# Patient Record
Sex: MALE | Race: WHITE | NOT HISPANIC OR LATINO | Employment: FULL TIME | URBAN - METROPOLITAN AREA
[De-identification: names, ages, dates, MRNs, and addresses within clinical notes are randomized per-mention and may not be internally consistent; named-entity substitution may affect disease eponyms.]

---

## 2019-07-15 ENCOUNTER — OFFICE VISIT (OUTPATIENT)
Dept: DERMATOLOGY | Facility: CLINIC | Age: 39
End: 2019-07-15
Payer: COMMERCIAL

## 2019-07-15 VITALS — HEART RATE: 68 BPM | DIASTOLIC BLOOD PRESSURE: 67 MMHG | SYSTOLIC BLOOD PRESSURE: 120 MMHG

## 2019-07-15 DIAGNOSIS — D22.9 NEVUS: Primary | ICD-10-CM

## 2019-07-15 DIAGNOSIS — L30.9 DERMATITIS: ICD-10-CM

## 2019-07-15 RX ORDER — OMEPRAZOLE 40 MG/1
40 CAPSULE, DELAYED RELEASE ORAL
COMMUNITY
Start: 2016-07-25

## 2019-07-15 RX ORDER — TADALAFIL 20 MG/1
10 TABLET ORAL
COMMUNITY
Start: 2019-01-04

## 2019-07-15 RX ORDER — TESTOSTERONE CYPIONATE 200 MG/ML
120 INJECTION, SOLUTION INTRAMUSCULAR
COMMUNITY
Start: 2016-11-22

## 2019-07-15 RX ORDER — CHOLECALCIFEROL (VITAMIN D3) 50 MCG
2000 TABLET ORAL
COMMUNITY
Start: 2018-07-31

## 2019-07-15 RX ORDER — VALACYCLOVIR HYDROCHLORIDE 1 G/1
1 TABLET, FILM COATED ORAL
COMMUNITY
Start: 2019-01-04

## 2019-07-15 RX ORDER — MULTIVIT-MIN/IRON FUM/FOLIC AC 7.5 MG-4
1 TABLET ORAL
COMMUNITY
Start: 2016-12-19

## 2019-07-15 ASSESSMENT — PAIN SCALES - GENERAL: PAINLEVEL: NO PAIN (0)

## 2019-07-15 NOTE — PROGRESS NOTES
[facility] Dermatology Note      Dermatology Problem List:  1. Multiple clinically benign nevi  - monitor, consider mole mapping and visit in the [facility]mented Lesions Clinic with Dr. Lazcano  2. Xerosis, dry scaly plaque on only the right medial and lateral foot    KOH prep was obtained and interpreted as negative.     Photodocumentation    Start Protopic - apply with flares       Encounter Date: Jul 15, 2019    CC:  Chief Complaint   Patient presents with     Skin Check     No personal or family hx of skin cancer. Romeo notes moles on his back.         History of Present Illness:  Mr. Trey Ghosh is a 38 year old male who presents for evaluation of moles on the back. He has never seen a dermatologist before. Today his main concern is with moles present on the back. His wife reports that there is one on the back that changes in color, occasionally gets red and darker. Also reports of getting sunburnt occasionally. On the feet, the patient has experienced occasional itch, dry skin and bumps. Happens year round, no irritation from the shoes. Has not tried any treatment. Denies pain, tenderness or burning. Reports redness of the toes with no swelling.     Wife and daughter present at today's visit.     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    Social History:  No personal hx of skin cancer.   Patient is , has 3 children.     Family History:  No family hx of skin cancer.  Family History   Problem Relation Age of Onset     Autoimmune Disease No family hx of      Melanoma No family hx of      Skin Cancer No family hx of        Medications:  Current Outpatient Medications   Medication Sig Dispense Refill     multivitamins w/minerals tablet Take 1 tablet by mouth       Omega-3 Fatty Acids  (FISH OIL PO) Take 2 g by mouth       omeprazole (PRILOSEC) 40 MG DR capsule Take 40 mg by mouth       tadalafil (CIALIS) 20 MG tablet Take 10 mg by mouth       testosterone cypionate (DEPO-TESTOSTERONE) 200 MG/ML injection Inject 120 mg into the muscle       valACYclovir (VALTREX) 1000 mg tablet Take 1 g by mouth       vitamin D3 (CHOLECALCIFEROL) 2000 units (50 mcg) tablet Take 2,000 Units by mouth         Allergies   Allergen Reactions     Penicillins Hives and Nausea     Other reaction(s): Gastrointestinal       Review of Systems:  -Skin: as per HPI.  -Constitutional: The patient is feeling generally well.    Physical exam:  Vitals: /67   Pulse 68   GEN: This is a well developed, well-nourished male in no acute distress, in a pleasant mood.    SKIN: Total skin excluding the undergarment areas was performed. The exam included the head/face, neck, both arms, chest, back, abdomen, both legs, digits and/or nails.   - Multiple regular brown pigmented macules and papules are identified on the neck, trunk, back and extremities.    - small papules on the face   - Calluses on the hands   - Blanching erythema on both 2nd distal toes  - Dry scaly plaque on only the right medial and lateral foot   - No other lesions of concern on areas examined.       Impression/Plan:  1. Multiple clinically benign nevi     No further intervention needed, monitor and patient to report changes.     Recommended following up with pigmented lesions clinic.     Photodocumentation     2. Xerosis, dry scaly plaque on only the right medial and lateral foot    KOH prep was obtained and interpreted as negative.     Photodocumentation    Start Protopic - apply with flares     3.   Blanching erythema on both 2nd distal toes, non tender to palpation and does not disappear with elevation     No further intervention needed.     Photodocumentation    Could consider evaluation with vascular      Follow-up in 6 months with Dr. Lazcano for mole  mapping, earlier for new or changing lesions.       Scribe Disclosure  I, Anna Mensah, am serving as a scribe to document services personally performed by Dr. Barbara Mosquera MD, based on data collection and the provider's statements to me.     Provider Disclosure:   I agree with above History, Review of Systems, Physical exam and Plan. I have reviewed the content of the documentation and have edited it as needed. I have personally performed the services documented here and the documentation accurately represents those services and the decisions I have made.       Laurie Weber MD  Professor and Chair  Department of Dermatology  [facility]

## 2019-07-15 NOTE — LETTER
7/15/2019       RE: Trey Ghosh  [address]  [place] [facility] [address]     Dear Colleague,    Thank you for referring your patient, Trey Ghosh, to the [facility] DERMATOLOGY at [facility]. Please see a copy of my visit note below.    [facility] Dermatology Note      Dermatology Problem List:  1. Multiple clinically benign nevi  - monitor, consider mole mapping and visit in the Pigmented Lesions Clinic with Dr. Lazcano  2. Xerosis, dry scaly plaque on only the right medial and lateral foot    KOH prep was obtained and interpreted as negative.     Photodocumentation    Start Protopic - apply with flares       Encounter Date: Jul 15, 2019    CC:  Chief Complaint   Patient presents with     Skin Check     No personal or family hx of skin cancer. Romeo notes moles on his back.         History of Present Illness:  Mr. Trey Ghosh is a 38 year old male who presents for evaluation of moles on the back. He has never seen a dermatologist before. Today his main concern is with moles present on the back. His wife reports that there is one on the back that changes in color, occasionally gets red and darker. Also reports of getting sunburnt occasionally. On the feet, the patient has experienced occasional itch, dry skin and bumps. Happens year round, no irritation from the shoes. Has not tried any treatment. Denies pain, tenderness or burning. Reports redness of the toes with no swelling.     Wife and daughter present at today's visit.     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    Social History:  No personal hx of skin cancer.   Patient is , has 3 children.     Family History:  No family hx of skin cancer.  Family History   Problem  Relation Age of Onset     Autoimmune Disease No family hx of      Melanoma No family hx of      Skin Cancer No family hx of        Medications:  Current Outpatient Medications   Medication Sig Dispense Refill     multivitamins w/minerals tablet Take 1 tablet by mouth       Omega-3 Fatty Acids (FISH OIL PO) Take 2 g by mouth       omeprazole (PRILOSEC) 40 MG DR capsule Take 40 mg by mouth       tadalafil (CIALIS) 20 MG tablet Take 10 mg by mouth       testosterone cypionate (DEPO-TESTOSTERONE) 200 MG/ML injection Inject 120 mg into the muscle       valACYclovir (VALTREX) 1000 mg tablet Take 1 g by mouth       vitamin D3 (CHOLECALCIFEROL) 2000 units (50 mcg) tablet Take 2,000 Units by mouth         Allergies   Allergen Reactions     Penicillins Hives and Nausea     Other reaction(s): Gastrointestinal       Review of Systems:  -Skin: as per HPI.  -Constitutional: The patient is feeling generally well.    Physical exam:  Vitals: /67   Pulse 68   GEN: This is a well developed, well-nourished male in no acute distress, in a pleasant mood.    SKIN: Total skin excluding the undergarment areas was performed. The exam included the head/face, neck, both arms, chest, back, abdomen, both legs, digits and/or nails.   - Multiple regular brown pigmented macules and papules are identified on the neck, trunk, back and extremities.    - small papules on the face   - Calluses on the hands   - Blanching erythema on both 2nd distal toes  - Dry scaly plaque on only the right medial and lateral foot   - No other lesions of concern on areas examined.       Impression/Plan:  1. Multiple clinically benign nevi     No further intervention needed, monitor and patient to report changes.     Recommended following up with pigmented lesions clinic.     Photodocumentation     2. Xerosis, dry scaly plaque on only the right medial and lateral foot    KOH prep was obtained and interpreted as negative.     Photodocumentation    Start Protopic -  apply with flares     3.   Blanching erythema on both 2nd distal toes, non tender to palpation and does not disappear with elevation     No further intervention needed.     Photodocumentation    Could consider evaluation with vascular      Follow-up in 6 months with Dr. Lazcano for mole mapping, earlier for new or changing lesions.       Scribe Disclosure  I, Anna Mensah, am serving as a scribe to document services personally performed by Dr. Barbara Mosquera MD, based on data collection and the provider's statements to me.     Provider Disclosure:   I agree with above History, Review of Systems, Physical exam and Plan. I have reviewed the content of the documentation and have edited it as needed. I have personally performed the services documented here and the documentation accurately represents those services and the decisions I have made.       Laurie Weber MD  Professor and Chair  Department of Dermatology  [facility]

## 2019-07-15 NOTE — NURSING NOTE
Dermatology Rooming Note    Trey Ghosh's goals for this visit include:   Chief Complaint   Patient presents with     Skin Check     No personal or family hx of skin cancer. Romeo notes moles on his back.     Giulia Campbell, CMA

## 2019-10-02 ENCOUNTER — HEALTH MAINTENANCE LETTER (OUTPATIENT)
Age: 39
End: 2019-10-02

## 2021-01-15 ENCOUNTER — HEALTH MAINTENANCE LETTER (OUTPATIENT)
Age: 41
End: 2021-01-15

## 2021-06-07 ENCOUNTER — OFFICE VISIT (OUTPATIENT)
Dept: DERMATOLOGY | Facility: CLINIC | Age: 41
End: 2021-06-07
Payer: COMMERCIAL

## 2021-06-07 DIAGNOSIS — Z53.9 ERRONEOUS ENCOUNTER--DISREGARD: Primary | ICD-10-CM

## 2021-06-07 NOTE — LETTER
6/7/2021       RE: Trey Ghosh  [address] [facility] [address]     Dear Colleague,    Thank you for referring your patient, Trey Ghosh, to the [facility] DERMATOLOGY CLINIC [facility]. Please see a copy of my visit note below.      This encounter was opened in error. Please disregard.      Again, thank you for allowing me to participate in the care of your patient.      Sincerely,    Laurie Weber MD

## 2021-09-05 ENCOUNTER — HEALTH MAINTENANCE LETTER (OUTPATIENT)
Age: 41
End: 2021-09-05

## 2022-02-19 ENCOUNTER — HEALTH MAINTENANCE LETTER (OUTPATIENT)
Age: 42
End: 2022-02-19

## 2022-10-22 ENCOUNTER — HEALTH MAINTENANCE LETTER (OUTPATIENT)
Age: 42
End: 2022-10-22

## 2023-03-08 ENCOUNTER — TELEPHONE (OUTPATIENT)
Dept: DERMATOLOGY | Facility: CLINIC | Age: 43
End: 2023-03-08
Payer: COMMERCIAL

## 2023-04-01 ENCOUNTER — HEALTH MAINTENANCE LETTER (OUTPATIENT)
Age: 43
End: 2023-04-01

## 2023-11-30 ENCOUNTER — OFFICE VISIT (OUTPATIENT)
Dept: DERMATOLOGY | Facility: CLINIC | Age: 43
End: 2023-11-30
Payer: COMMERCIAL

## 2023-11-30 DIAGNOSIS — L30.8 PSORIASIFORM DERMATITIS: Primary | ICD-10-CM

## 2023-11-30 DIAGNOSIS — D22.9 MULTIPLE BENIGN NEVI: ICD-10-CM

## 2023-11-30 DIAGNOSIS — D18.01 CHERRY ANGIOMA: ICD-10-CM

## 2023-11-30 DIAGNOSIS — L73.9 FOLLICULITIS: ICD-10-CM

## 2023-11-30 DIAGNOSIS — L21.9 DERMATITIS, SEBORRHEIC: ICD-10-CM

## 2023-11-30 PROCEDURE — 99204 OFFICE O/P NEW MOD 45 MIN: CPT | Mod: GC | Performed by: DERMATOLOGY

## 2023-11-30 RX ORDER — TRIAMCINOLONE ACETONIDE 1 MG/G
OINTMENT TOPICAL
Qty: 80 G | Refills: 1 | Status: SHIPPED | OUTPATIENT
Start: 2023-11-30

## 2023-11-30 RX ORDER — KETOCONAZOLE 20 MG/ML
SHAMPOO TOPICAL DAILY PRN
Qty: 120 ML | Refills: 11 | Status: SHIPPED | OUTPATIENT
Start: 2023-11-30

## 2023-11-30 ASSESSMENT — PAIN SCALES - GENERAL: PAINLEVEL: NO PAIN (0)

## 2023-11-30 NOTE — PATIENT INSTRUCTIONS
-L'Applique Lotion Applicator  -For Scalp, alternate using a benzoyl peroxide wash and a ketoconazole shampoo. Leave the ketoconazole in for five minutes then rinse out.     -Use triamcinolone until skin is smooth and two to three more days after. Do not use this on the face, armpits, groin, or buttocks.You can use this on the elbows, knees, as well as the back as needed.

## 2023-11-30 NOTE — PROGRESS NOTES
[facility] Dermatology Note  Encounter Date: Nov 30, 2023  Office Visit     Dermatology Problem List:  # Folliculitis + Seb Derm, scalp  Current tx: keto shampoo, BPO wash    #Psoriasiform Derm  Current tx: triam 0.1% ointment    #Nevi, Little angiomas  -Last FBSE 11/30/23    #Pertinent Medical History:  Pulmonary Sarcoid (not on treatment    ____________________________________________    Assessment & Plan:     # Folliculitis  #Seborrheic Dermatitis.  Scalp with pink dome shaped well demarcated papules surrounding hair follicles on scalp, with hypopigmented macules. He also has some patchy erythema; suspect he has a degree of folliculitis and seb derm. Discussed etiology and plan to treat as per below:  -Keto shampoo three times per week  -BPO wash on days not using keto   - if no improvement, biopsy may be indicated    # Psoriasiform Dermatitis.   Well demarcated erythematous scaly plaque on elbows. Lichenification on right knee. +Fhx of psoriasis. Appears psoriasiform. No vesicles, history of blistering, or personal history of celiac--less likely DH. Advised to trial triam 0.1% ointment BID until skin is smooth  -Triam 0.1% ointment BID prn    #Pruritus  Localized to right upper back. There is a well demarcated thin brown pink excoriated papule in this location. He also has a history of back/neck pain. Suspected focal itch may be related to lentigo vs early BLK > notalgia paresthetica. Advised to trial triam as given above.    #Benign Melanocytic Nevi, Little Angiomas  Exam notable for symmetric, well circumscribed brown papules. Benign reassurance provided.    Procedures Performed:   none    Follow-up: 2 month(s) in-person, or earlier for new or changing lesions    Staff and Resident:     Bayron Kramer MD  Internal Medicine Dermatology PGY-2    Staff: Dr. Weber     Patient was seen and examined with the medicine/dermatology resident. I agree with the history, review of systems, physical  "examination, assessments and plan.    Laurie Weber MD  Professor and  Chair  Department of Dermatology  [facility]   ____________________________________________    CC: No chief complaint on file.    HPI:  Mr. Trey Ghosh is a(n) 42 year old male who presents today as a new patient for evaluation of rash on elbows, spots on scalp, and skin check.    He has a history of pulmonary sarcoidosis, diagnosed in 2007 (treated with steroids, reportedly off since 2013), as well as hypogonadism (on testosterone replacement therapy). Denies sarcoid flares.    Psoriasiform-Itchy scaly bumps on elbows and knees. Present for one year.   Psoriasis in sister. Never had blisters here. No known history of celiac.    Spots on scalp- notes having white spots on scalp. Sometimes itchy. Denies dandruff (shaves head), but does note \"oily scalp\"    Skin check- no specific lesions of concern, but he does have an itchy spot on right upper back. Reports history of neck pain and back pain.    Patient is otherwise feeling well, without additional skin concerns.    Labs Reviewed:  N/A    Physical Exam:  Vitals: There were no vitals taken for this visit.  SKIN: Total skin excluding the undergarment areas was performed. The exam included the head/face, neck, both arms, chest, back, abdomen, both legs, digits and/or nails.   - scattered red dome shaped papules on trunk  -scattered well demarcated, symmetric brown thin papules and macules on trunk, extremities  -scalp with dome shaped, well defined pink papules in follicular distribution on scalp with similarly distributed hypopigmented macules which are accentuated with dermatoscopic examination suggestive of sebaceous etiology  -well demarcated erythematous pink to red scale papules on b/l elbows  -right knee with lichenification and scale    - No other lesions of concern on areas examined.     Medications:  Current Outpatient Medications   Medication    multivitamins " w/minerals tablet    Omega-3 Fatty Acids (FISH OIL PO)    omeprazole (PRILOSEC) 40 MG DR capsule    tadalafil (CIALIS) 20 MG tablet    testosterone cypionate (DEPO-TESTOSTERONE) 200 MG/ML injection    valACYclovir (VALTREX) 1000 mg tablet    vitamin D3 (CHOLECALCIFEROL) 2000 units (50 mcg) tablet     No current facility-administered medications for this visit.      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

## 2023-11-30 NOTE — NURSING NOTE
Dermatology Rooming Note    Trey Ghosh's goals for this visit include:   Chief Complaint   Patient presents with    Skin Check     Romeo is here today for a skin check and is concerned about is elbows. They get bumpy and scaly. He is also concerned about white spots on his head. Spot on back.     Luis BENTLEY CMA

## 2023-11-30 NOTE — LETTER
11/30/2023       RE: Trey Ghosh  [address] [facility] [address]     Dear Colleague,    Thank you for referring your patient, Trey Ghosh, to the [facility] DERMATOLOGY CLINIC [place] at [facility]. Please see a copy of my visit note below.    [facility] Dermatology Note  Encounter Date: Nov 30, 2023  Office Visit     Dermatology Problem List:  # Folliculitis + Seb Derm, scalp  Current tx: keto shampoo, BPO wash    #Psoriasiform Derm  Current tx: triam 0.1% ointment    #Nevi, Little angiomas  -Last FBSE 11/30/23    #Pertinent Medical History:  Pulmonary Sarcoid (not on treatment    ____________________________________________    Assessment & Plan:     # Folliculitis  #Seborrheic Dermatitis.  Scalp with pink dome shaped well demarcated papules surrounding hair follicles on scalp, with hypopigmented macules. He also has some patchy erythema; suspect he has a degree of folliculitis and seb derm. Discussed etiology and plan to treat as per below:  -Keto shampoo three times per week  -BPO wash on days not using keto   - if no improvement, biopsy may be indicated    # Psoriasiform Dermatitis.   Well demarcated erythematous scaly plaque on elbows. Lichenification on right knee. +Fhx of psoriasis. Appears psoriasiform. No vesicles, history of blistering, or personal history of celiac--less likely DH. Advised to trial triam 0.1% ointment BID until skin is smooth  -Triam 0.1% ointment BID prn    #Pruritus  Localized to right upper back. There is a well demarcated thin brown pink excoriated papule in this location. He also has a history of back/neck pain. Suspected focal itch may be related to lentigo vs early BLK > notalgia paresthetica. Advised to trial triam as given above.    #Benign Melanocytic Nevi, Little Angiomas  Exam notable for symmetric, well circumscribed brown papules. Benign reassurance  "provided.    Procedures Performed:   none    Follow-up: 2 month(s) in-person, or earlier for new or changing lesions    Staff and Resident:     Bayron Kramer MD  Internal Medicine Dermatology PGY-2    Staff: Dr. Weber     Patient was seen and examined with the medicine/dermatology resident. I agree with the history, review of systems, physical examination, assessments and plan.    Laurie Weber MD  Professor and  Chair  Department of Dermatology  [facility]   ____________________________________________    CC: No chief complaint on file.    HPI:  Mr. Trey Ghosh is a(n) 42 year old male who presents today as a new patient for evaluation of rash on elbows, spots on scalp, and skin check.    He has a history of pulmonary sarcoidosis, diagnosed in 2007 (treated with steroids, reportedly off since 2013), as well as hypogonadism (on testosterone replacement therapy). Denies sarcoid flares.    Psoriasiform-Itchy scaly bumps on elbows and knees. Present for one year.   Psoriasis in sister. Never had blisters here. No known history of celiac.    Spots on scalp- notes having white spots on scalp. Sometimes itchy. Denies dandruff (shaves head), but does note \"oily scalp\"    Skin check- no specific lesions of concern, but he does have an itchy spot on right upper back. Reports history of neck pain and back pain.    Patient is otherwise feeling well, without additional skin concerns.    Labs Reviewed:  N/A    Physical Exam:  Vitals: There were no vitals taken for this visit.  SKIN: Total skin excluding the undergarment areas was performed. The exam included the head/face, neck, both arms, chest, back, abdomen, both legs, digits and/or nails.   - scattered red dome shaped papules on trunk  -scattered well demarcated, symmetric brown thin papules and macules on trunk, extremities  -scalp with dome shaped, well defined pink papules in follicular distribution on scalp with similarly distributed " hypopigmented macules which are accentuated with dermatoscopic examination suggestive of sebaceous etiology  -well demarcated erythematous pink to red scale papules on b/l elbows  -right knee with lichenification and scale    - No other lesions of concern on areas examined.     Medications:  Current Outpatient Medications   Medication    multivitamins w/minerals tablet    Omega-3 Fatty Acids (FISH OIL PO)    omeprazole (PRILOSEC) 40 MG DR capsule    tadalafil (CIALIS) 20 MG tablet    testosterone cypionate (DEPO-TESTOSTERONE) 200 MG/ML injection    valACYclovir (VALTREX) 1000 mg tablet    vitamin D3 (CHOLECALCIFEROL) 2000 units (50 mcg) tablet     No current facility-administered medications for this visit.      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

## 2024-06-02 ENCOUNTER — HEALTH MAINTENANCE LETTER (OUTPATIENT)
Age: 44
End: 2024-06-02

## 2025-06-15 ENCOUNTER — HEALTH MAINTENANCE LETTER (OUTPATIENT)
Age: 45
End: 2025-06-15